# Patient Record
Sex: MALE | Race: WHITE | NOT HISPANIC OR LATINO | RURAL
[De-identification: names, ages, dates, MRNs, and addresses within clinical notes are randomized per-mention and may not be internally consistent; named-entity substitution may affect disease eponyms.]

---

## 2017-10-23 ENCOUNTER — OFFICE VISIT (OUTPATIENT)
Dept: RETAIL CLINIC | Facility: CLINIC | Age: 42
End: 2017-10-23

## 2017-10-23 VITALS
HEART RATE: 73 BPM | BODY MASS INDEX: 24.01 KG/M2 | TEMPERATURE: 97.9 F | RESPIRATION RATE: 14 BRPM | WEIGHT: 158.4 LBS | OXYGEN SATURATION: 99 % | HEIGHT: 68 IN

## 2017-10-23 DIAGNOSIS — B36.0 TINEA VERSICOLOR: Primary | ICD-10-CM

## 2017-10-23 PROCEDURE — 99202 OFFICE O/P NEW SF 15 MIN: CPT | Performed by: NURSE PRACTITIONER

## 2017-10-23 RX ORDER — CLOTRIMAZOLE AND BETAMETHASONE DIPROPIONATE 10; .64 MG/G; MG/G
CREAM TOPICAL 2 TIMES DAILY
Qty: 45 G | Refills: 2 | Status: SHIPPED | OUTPATIENT
Start: 2017-10-23 | End: 2017-11-06

## 2017-10-23 NOTE — PROGRESS NOTES
"Subjective   Pj Garza is a 42 y.o. male.     Rash   This is a new problem. The current episode started 1 to 4 weeks ago. The problem has been gradually worsening since onset. The rash is diffuse. The rash is characterized by dryness, itchiness and scaling. It is unknown if there was an exposure to a precipitant. Past treatments include anti-itch cream. The treatment provided no relief. There is no history of allergies.        The following portions of the patient's history were reviewed and updated as appropriate: allergies, current medications, past medical history, past social history, past surgical history and problem list.    Review of Systems   Constitutional: Negative.    Respiratory: Negative.    Cardiovascular: Negative.    Skin: Positive for rash (discolored, mildly itching rash on chest and back, worsening, spreading, unresponsive to otc medications).   Hematological: Negative.         Pulse 73  Temp 97.9 °F (36.6 °C) (Temporal Artery )   Resp 14  Ht 68\" (172.7 cm)  Wt 158 lb 6.4 oz (71.8 kg)  SpO2 99%  BMI 24.08 kg/m2     Objective   Physical Exam   Constitutional: He is oriented to person, place, and time. He appears well-developed and well-nourished. No distress.   Neurological: He is alert and oriented to person, place, and time.   Skin: Skin is warm and dry. Rash (multiple discrete, scaly, depigmented lesion on chest and upper back, no exudate or other signs of infection noted) noted.   Psychiatric: He has a normal mood and affect. His behavior is normal. Thought content normal.   Vitals reviewed.      Assessment/Plan   Pj was seen today for rash.    Diagnoses and all orders for this visit:    Tinea versicolor  -     clotrimazole-betamethasone (LOTRISONE) 1-0.05 % cream; Apply  topically 2 (Two) Times a Day for 14 days.               "

## 2017-10-23 NOTE — PATIENT INSTRUCTIONS
Tinea Versicolor  Tinea versicolor is a common fungal infection of the skin. It causes a rash that appears as light or dark patches on the skin. The rash most often occurs on the chest, back, neck, or upper arms. This condition is more common during warm weather.  Other than affecting how your skin looks, tinea versicolor usually does not cause other problems. In most cases, the infection goes away in a few weeks with treatment. It may take a few months for the patches on your skin to clear up.  CAUSES  Tinea versicolor occurs when a type of fungus that is normally present on the skin starts to overgrow. This fungus is a kind of yeast. The exact cause of the overgrowth is not known. This condition cannot be passed from one person to another (noncontagious).  RISK FACTORS  This condition is more likely to develop when certain factors are present, such as:  · Heat and humidity.  · Sweating too much.  · Hormone changes.  · Oily skin.  · A weak defense (immune) system.  SYMPTOMS  Symptoms of this condition may include:  · A rash on your skin that is made up of light or dark patches. The rash may have:    Patches of tan or pink spots on light skin.    Patches of white or brown spots on dark skin.    Patches of skin that do not tan.    Well-marked edges.    Scales on the discolored areas.  · Mild itching.  DIAGNOSIS  A health care provider can usually diagnose this condition by looking at your skin. During the exam, he or she may use ultraviolet light to help determine the extent of the infection. In some cases, a skin sample may be taken by scraping the rash. This sample will be viewed under a microscope to check for yeast overgrowth.  TREATMENT  Treatment for this condition may include:  · Dandruff shampoo that is applied to the affected skin during showers or bathing.  · Over-the-counter medicated skin cream, lotion, or soaps.  · Prescription antifungal medicine in the form of skin cream or pills.  · Medicine to help  reduce itching.  HOME CARE INSTRUCTIONS  · Take medicines only as directed by your health care provider.  · Apply dandruff shampoo to the affected area if told to do so by your health care provider. You may be instructed to scrub the affected skin for several minutes each day.  · Do not scratch the affected area of skin.  · Avoid hot and humid conditions.  · Do not use tanning booths.  · Try to avoid sweating a lot.  SEEK MEDICAL CARE IF:  · Your symptoms get worse.  · You have a fever.  · You have redness, swelling, or pain at the site of your rash.  · You have fluid, blood, or pus coming from your rash.  · Your rash returns after treatment.     This information is not intended to replace advice given to you by your health care provider. Make sure you discuss any questions you have with your health care provider.     Document Released: 12/15/2001 Document Revised: 01/08/2016 Document Reviewed: 09/29/2015  [employer] Interactive Patient Education ©2017 [employer]